# Patient Record
Sex: MALE | Race: OTHER | HISPANIC OR LATINO | URBAN - METROPOLITAN AREA
[De-identification: names, ages, dates, MRNs, and addresses within clinical notes are randomized per-mention and may not be internally consistent; named-entity substitution may affect disease eponyms.]

---

## 2018-03-23 ENCOUNTER — EMERGENCY (EMERGENCY)
Facility: HOSPITAL | Age: 48
LOS: 1 days | Discharge: HOME | End: 2018-03-23
Attending: EMERGENCY MEDICINE | Admitting: EMERGENCY MEDICINE
Payer: SELF-PAY

## 2018-03-23 VITALS
TEMPERATURE: 98 F | RESPIRATION RATE: 18 BRPM | DIASTOLIC BLOOD PRESSURE: 81 MMHG | HEART RATE: 78 BPM | OXYGEN SATURATION: 100 % | SYSTOLIC BLOOD PRESSURE: 119 MMHG

## 2018-03-23 VITALS — HEIGHT: 64 IN | WEIGHT: 141.1 LBS

## 2018-03-23 LAB
ALBUMIN SERPL ELPH-MCNC: 4.7 G/DL — SIGNIFICANT CHANGE UP (ref 3.3–5.2)
ALP SERPL-CCNC: 57 U/L — SIGNIFICANT CHANGE UP (ref 40–120)
ALT FLD-CCNC: 20 U/L — SIGNIFICANT CHANGE UP
ANION GAP SERPL CALC-SCNC: 11 MMOL/L — SIGNIFICANT CHANGE UP (ref 5–17)
APPEARANCE UR: CLEAR — SIGNIFICANT CHANGE UP
AST SERPL-CCNC: 25 U/L — SIGNIFICANT CHANGE UP
BASOPHILS # BLD AUTO: 0 K/UL — SIGNIFICANT CHANGE UP (ref 0–0.2)
BASOPHILS NFR BLD AUTO: 0.6 % — SIGNIFICANT CHANGE UP (ref 0–2)
BILIRUB SERPL-MCNC: 0.7 MG/DL — SIGNIFICANT CHANGE UP (ref 0.4–2)
BILIRUB UR-MCNC: NEGATIVE — SIGNIFICANT CHANGE UP
BUN SERPL-MCNC: 10 MG/DL — SIGNIFICANT CHANGE UP (ref 8–20)
CALCIUM SERPL-MCNC: 9.7 MG/DL — SIGNIFICANT CHANGE UP (ref 8.6–10.2)
CHLORIDE SERPL-SCNC: 102 MMOL/L — SIGNIFICANT CHANGE UP (ref 98–107)
CO2 SERPL-SCNC: 26 MMOL/L — SIGNIFICANT CHANGE UP (ref 22–29)
COLOR SPEC: YELLOW — SIGNIFICANT CHANGE UP
CREAT SERPL-MCNC: 0.65 MG/DL — SIGNIFICANT CHANGE UP (ref 0.5–1.3)
DIFF PNL FLD: NEGATIVE — SIGNIFICANT CHANGE UP
EOSINOPHIL # BLD AUTO: 0 K/UL — SIGNIFICANT CHANGE UP (ref 0–0.5)
EOSINOPHIL NFR BLD AUTO: 0.8 % — SIGNIFICANT CHANGE UP (ref 0–5)
GLUCOSE SERPL-MCNC: 102 MG/DL — SIGNIFICANT CHANGE UP (ref 70–115)
GLUCOSE UR QL: NEGATIVE MG/DL — SIGNIFICANT CHANGE UP
HCT VFR BLD CALC: 45.9 % — SIGNIFICANT CHANGE UP (ref 42–52)
HGB BLD-MCNC: 15.8 G/DL — SIGNIFICANT CHANGE UP (ref 14–18)
KETONES UR-MCNC: NEGATIVE — SIGNIFICANT CHANGE UP
LEUKOCYTE ESTERASE UR-ACNC: NEGATIVE — SIGNIFICANT CHANGE UP
LYMPHOCYTES # BLD AUTO: 2.3 K/UL — SIGNIFICANT CHANGE UP (ref 1–4.8)
LYMPHOCYTES # BLD AUTO: 43.8 % — SIGNIFICANT CHANGE UP (ref 20–55)
MCHC RBC-ENTMCNC: 29.9 PG — SIGNIFICANT CHANGE UP (ref 27–31)
MCHC RBC-ENTMCNC: 34.4 G/DL — SIGNIFICANT CHANGE UP (ref 32–36)
MCV RBC AUTO: 86.8 FL — SIGNIFICANT CHANGE UP (ref 80–94)
MONOCYTES # BLD AUTO: 0.2 K/UL — SIGNIFICANT CHANGE UP (ref 0–0.8)
MONOCYTES NFR BLD AUTO: 4.8 % — SIGNIFICANT CHANGE UP (ref 3–10)
NEUTROPHILS # BLD AUTO: 2.6 K/UL — SIGNIFICANT CHANGE UP (ref 1.8–8)
NEUTROPHILS NFR BLD AUTO: 50 % — SIGNIFICANT CHANGE UP (ref 37–73)
NITRITE UR-MCNC: NEGATIVE — SIGNIFICANT CHANGE UP
PH UR: 7 — SIGNIFICANT CHANGE UP (ref 5–8)
PLATELET # BLD AUTO: 262 K/UL — SIGNIFICANT CHANGE UP (ref 150–400)
POTASSIUM SERPL-MCNC: 4 MMOL/L — SIGNIFICANT CHANGE UP (ref 3.5–5.3)
POTASSIUM SERPL-SCNC: 4 MMOL/L — SIGNIFICANT CHANGE UP (ref 3.5–5.3)
PROT SERPL-MCNC: 8 G/DL — SIGNIFICANT CHANGE UP (ref 6.6–8.7)
PROT UR-MCNC: NEGATIVE MG/DL — SIGNIFICANT CHANGE UP
RBC # BLD: 5.29 M/UL — SIGNIFICANT CHANGE UP (ref 4.6–6.2)
RBC # FLD: 12.7 % — SIGNIFICANT CHANGE UP (ref 11–15.6)
SODIUM SERPL-SCNC: 139 MMOL/L — SIGNIFICANT CHANGE UP (ref 135–145)
SP GR SPEC: 1 — LOW (ref 1.01–1.02)
UROBILINOGEN FLD QL: NEGATIVE MG/DL — SIGNIFICANT CHANGE UP
WBC # BLD: 5.2 K/UL — SIGNIFICANT CHANGE UP (ref 4.8–10.8)
WBC # FLD AUTO: 5.2 K/UL — SIGNIFICANT CHANGE UP (ref 4.8–10.8)

## 2018-03-23 PROCEDURE — 99284 EMERGENCY DEPT VISIT MOD MDM: CPT | Mod: 25

## 2018-03-23 PROCEDURE — 99284 EMERGENCY DEPT VISIT MOD MDM: CPT

## 2018-03-23 PROCEDURE — 80053 COMPREHEN METABOLIC PANEL: CPT

## 2018-03-23 PROCEDURE — 74177 CT ABD & PELVIS W/CONTRAST: CPT

## 2018-03-23 PROCEDURE — 85027 COMPLETE CBC AUTOMATED: CPT

## 2018-03-23 PROCEDURE — T1013: CPT

## 2018-03-23 PROCEDURE — 36415 COLL VENOUS BLD VENIPUNCTURE: CPT

## 2018-03-23 PROCEDURE — 74177 CT ABD & PELVIS W/CONTRAST: CPT | Mod: 26

## 2018-03-23 PROCEDURE — 76870 US EXAM SCROTUM: CPT

## 2018-03-23 PROCEDURE — 76870 US EXAM SCROTUM: CPT | Mod: 26

## 2018-03-23 PROCEDURE — 81003 URINALYSIS AUTO W/O SCOPE: CPT

## 2018-03-23 PROCEDURE — 96374 THER/PROPH/DIAG INJ IV PUSH: CPT | Mod: XU

## 2018-03-23 RX ORDER — KETOROLAC TROMETHAMINE 30 MG/ML
15 SYRINGE (ML) INJECTION ONCE
Qty: 0 | Refills: 0 | Status: DISCONTINUED | OUTPATIENT
Start: 2018-03-23 | End: 2018-03-23

## 2018-03-23 RX ADMIN — Medication 15 MILLIGRAM(S): at 11:43

## 2018-03-23 NOTE — ED PROVIDER NOTE - CONDUCTED A DETAILED DISCUSSION WITH PATIENT AND/OR GUARDIAN REGARDING, MDM
radiology results/lab results return to ED if symptoms worsen, persist or questions arise/need for outpatient follow-up/radiology results/lab results

## 2018-03-23 NOTE — ED PROVIDER NOTE - ATTENDING CONTRIBUTION TO CARE
seen with acp  c/o right inguinal hernia and swelling noted over right groin  PE reveals a reducible right inguinal hernia  had a ct and ultrasound  which reveals a hydrocoele and hernia  will refer to clinic for repair  Agree with acps assessment hx and physical

## 2018-03-23 NOTE — ED PROVIDER NOTE - OBJECTIVE STATEMENT
49 y/o M presents with c/o right inguinal hernia x 2 months.  He presents today with increased pain.  Was seen by PCP 3/8 Dr. Shaffer and told to come to the ED because he has no insurance.   He states that when he lays down it goes back in.

## 2018-03-23 NOTE — ED PROVIDER NOTE - PROGRESS NOTE DETAILS
NP NOTE:  Labs and Ct reviewed, no evidence of inguinal hernia, recommend US of scrotum. NP note:  US with bilateral hydrocele.  Will d/c home refer to Urology.

## 2018-03-23 NOTE — ED ADULT NURSE NOTE - OBJECTIVE STATEMENT
pt was seen at Dr Shaffer for hernia surg eval and sent here "they told us to come here to see if he needs surgery"  pt with right groin pain for 2 mths  took advil this AM

## 2023-10-14 ENCOUNTER — EMERGENCY (EMERGENCY)
Facility: HOSPITAL | Age: 53
LOS: 1 days | Discharge: HOME | End: 2023-10-14
Attending: EMERGENCY MEDICINE
Payer: COMMERCIAL

## 2023-10-14 VITALS
OXYGEN SATURATION: 99 % | SYSTOLIC BLOOD PRESSURE: 149 MMHG | HEART RATE: 97 BPM | HEIGHT: 63 IN | WEIGHT: 149.69 LBS | TEMPERATURE: 98 F | RESPIRATION RATE: 16 BRPM | DIASTOLIC BLOOD PRESSURE: 76 MMHG

## 2023-10-14 PROCEDURE — T1013: CPT

## 2023-10-14 PROCEDURE — 96374 THER/PROPH/DIAG INJ IV PUSH: CPT

## 2023-10-14 PROCEDURE — 99284 EMERGENCY DEPT VISIT MOD MDM: CPT | Mod: 25

## 2023-10-14 PROCEDURE — 90715 TDAP VACCINE 7 YRS/> IM: CPT

## 2023-10-14 PROCEDURE — 73140 X-RAY EXAM OF FINGER(S): CPT | Mod: 26,RT

## 2023-10-14 PROCEDURE — 99283 EMERGENCY DEPT VISIT LOW MDM: CPT

## 2023-10-14 PROCEDURE — 99285 EMERGENCY DEPT VISIT HI MDM: CPT

## 2023-10-14 PROCEDURE — 90471 IMMUNIZATION ADMIN: CPT

## 2023-10-14 PROCEDURE — 73140 X-RAY EXAM OF FINGER(S): CPT

## 2023-10-14 RX ORDER — TETANUS TOXOID, REDUCED DIPHTHERIA TOXOID AND ACELLULAR PERTUSSIS VACCINE, ADSORBED 5; 2.5; 8; 8; 2.5 [IU]/.5ML; [IU]/.5ML; UG/.5ML; UG/.5ML; UG/.5ML
0.5 SUSPENSION INTRAMUSCULAR ONCE
Refills: 0 | Status: COMPLETED | OUTPATIENT
Start: 2023-10-14 | End: 2023-10-14

## 2023-10-14 RX ORDER — CEPHALEXIN 500 MG
1 CAPSULE ORAL
Qty: 40 | Refills: 0
Start: 2023-10-14 | End: 2023-10-23

## 2023-10-14 RX ORDER — CEFAZOLIN SODIUM 1 G
1000 VIAL (EA) INJECTION ONCE
Refills: 0 | Status: DISCONTINUED | OUTPATIENT
Start: 2023-10-14 | End: 2023-10-14

## 2023-10-14 RX ORDER — CEFAZOLIN SODIUM 1 G
1000 VIAL (EA) INJECTION ONCE
Refills: 0 | Status: COMPLETED | OUTPATIENT
Start: 2023-10-14 | End: 2023-10-14

## 2023-10-14 RX ADMIN — Medication 1000 MILLIGRAM(S): at 16:19

## 2023-10-14 RX ADMIN — TETANUS TOXOID, REDUCED DIPHTHERIA TOXOID AND ACELLULAR PERTUSSIS VACCINE, ADSORBED 0.5 MILLILITER(S): 5; 2.5; 8; 8; 2.5 SUSPENSION INTRAMUSCULAR at 16:04

## 2023-10-14 NOTE — ED ADULT NURSE NOTE - NSFALLUNIVINTERV_ED_ALL_ED
Bed/Stretcher in lowest position, wheels locked, appropriate side rails in place/Call bell, personal items and telephone in reach/Instruct patient to call for assistance before getting out of bed/chair/stretcher/Non-slip footwear applied when patient is off stretcher/[place] to call system/Physically safe environment - no spills, clutter or unnecessary equipment/Purposeful proactive rounding/Room/bathroom lighting operational, light cord in reach

## 2023-10-14 NOTE — ED PROVIDER NOTE - OBJECTIVE STATEMENT
52 y/o M distal amputation of right middle finger which occurred 3 hours ago while fixing his car.  Not up to date on tetanus.

## 2023-10-14 NOTE — ED ADULT TRIAGE NOTE - CHIEF COMPLAINT QUOTE
Pt was changing a tire on a trailer and the toma slipped and fell onto patients right 3rd digit. +amputation to the tip of finger.

## 2023-10-14 NOTE — ED PROVIDER NOTE - NSFOLLOWUPINSTRUCTIONS_ED_ALL_ED_FT
leave splint clean and dry until follow up appointment next Friday, 10/20  take antibiotics as prescribed

## 2023-10-14 NOTE — ED PROVIDER NOTE - CARE PROVIDER_API CALL
Solo Lawrence  Plastic Surgery  [address] [place] [id number]  Phone: [phone]  Fax: [phone]  Follow Up Time:

## 2023-10-14 NOTE — ED ADULT NURSE NOTE - OBJECTIVE STATEMENT
Patient presents to ED C/O amputation to right 3th finger, reports injury while fixing tire when toma slipped and trailer fell on patient's hand, control bleeding, no blood thinners, resp even/unlabored.

## 2023-10-14 NOTE — ED PROVIDER NOTE - NS ED ATTENDING STATEMENT MOD
This was a shared visit with the CLARIBEL. I reviewed and verified the documentation and independently performed the documented:

## 2023-10-14 NOTE — ED PROVIDER NOTE - PATIENT PORTAL LINK FT
You can access the FollowMyHealth Patient Portal offered by [facility] by registering at the following website: [email or web address]. By joining [facility]’s FollowMyHealth portal, you will also be able to view your health information using other applications (apps) compatible with our system.

## 2023-10-14 NOTE — CONSULT NOTE ADULT - SUBJECTIVE AND OBJECTIVE BOX
Patient is a 53y Male presenting to the emergency department with a chief complaint of right 3rd digit amputation after      Review of Systems:    Denies fever, chills  Denies rashes  Denies SOB  Denies CP  Denies Abdominal pain  Denies paresthesias  Denies dysuria    PAST MEDICAL & SURGICAL HISTORY:  No pertinent past medical history    No significant past surgical history    Allergies: No Known Allergies    Medications:     FAMILY HISTORY:  : non-contributory    Social History: lives at  ETOH:  Smoking:    Ambulation:     PHYSICAL EXAM:    Vital Signs Last 24 Hrs  T(C): 36.7 (14 Oct 2023 14:27), Max: 36.7 (14 Oct 2023 14:27)  T(F): 98.1 (14 Oct 2023 14:27), Max: 98.1 (14 Oct 2023 14:27)  HR: 97 (14 Oct 2023 14:27) (97 - 97)  BP: 149/76 (14 Oct 2023 14:27) (149/76 - 149/76)  BP(mean): --  RR: 16 (14 Oct 2023 14:27) (16 - 16)  SpO2: 99% (14 Oct 2023 14:27) (99% - 99%)      Appearance: Alert, responsive, in no acute distress.                   Lower extremity:  Shortened, externally rotated  Skin intact, no open wounds, ecchymosis, rashes, s/o infection  positive log roll, negative pelvic compression test  Unable to perform Straight leg raise, hip ROM limited secondary to pain, TTP around hip joint/proximal thigh  EHL/TA/GS/FHL intact, Gross SILT distally  DP pulse 2+ PT pulse 1+   Calf soft, NT B/L             Upper Extremity:  AIN/PIN/intrinsics intact  SILT Rad/med/uln distrib. Rad pulse +    Imaging Studies:    A/P:  Pt is a  53y Male with Patient is a 53y old  Male who presents with a chief complaint of  found to have    PLAN:   NPO for OR tomorrow  IV fluids ordered and to start once NPO  Pre-operative ABX ordered  Routine daily anticoagulation held for OR  Single dose anticoaguation ordered  Medical clearance requested for procedure  Bed rest/NWB Patient seen and eval at bedside with hospital . Patient is a 53y Male presenting to the emergency department with a chief complaint of right 3rd digit amputation after a hitch fell on it changing a tire at home on his trailer. Patient denies pain anywhere else.    Review of Systems:    Denies fever, chills  Denies rashes  Denies SOB  Denies CP  Denies Abdominal pain  Denies paresthesias  Denies dysuria    PAST MEDICAL & SURGICAL HISTORY:  No pertinent past medical history    No significant past surgical history    Allergies: No Known Allergies    Medications: see med rec    FAMILY HISTORY:  : non-contributory    Social History:      PHYSICAL EXAM:    Vital Signs Last 24 Hrs  T(C): 36.7 (14 Oct 2023 14:27), Max: 36.7 (14 Oct 2023 14:27)  T(F): 98.1 (14 Oct 2023 14:27), Max: 98.1 (14 Oct 2023 14:27)  HR: 97 (14 Oct 2023 14:27) (97 - 97)  BP: 149/76 (14 Oct 2023 14:27) (149/76 - 149/76)  RR: 16 (14 Oct 2023 14:27) (16 - 16)  SpO2: 99% (14 Oct 2023 14:27) (99% - 99%)      Appearance: Alert, responsive, in no acute distress.  Right upper extremity:   Right hand 3rd digit distal aspect amputated. Amputated distal phalanx in plastic bag on ice at bedside.          AIN/PIN/intrinsics intact  SILT Rad/med/uln distrib. Rad pulse +2    Imaging Studies: right hand 3rd digit xrays reveal distal phalanx ampuation, distal to DIP joint.    A/P:  Pt is a  53y Male with distal phalanx amputation  PLAN:   D/w Dr. Lawrence - coming into the ED for revision amputation and closure  Cont care as per ED Patient seen and eval at bedside with hospital . Patient is a 53y Male presenting to the emergency department with a chief complaint of right 3rd digit amputation after a hitch fell on it changing a tire at home on his trailer. Patient denies pain anywhere else.    Review of Systems:    Denies fever, chills  Denies rashes  Denies SOB  Denies CP  Denies Abdominal pain  Denies paresthesias  Denies dysuria    PAST MEDICAL & SURGICAL HISTORY:  No pertinent past medical history    No significant past surgical history    Allergies: No Known Allergies    Medications: see med rec    FAMILY HISTORY:  : non-contributory    Social History:      PHYSICAL EXAM:    Vital Signs Last 24 Hrs  T(C): 36.7 (14 Oct 2023 14:27), Max: 36.7 (14 Oct 2023 14:27)  T(F): 98.1 (14 Oct 2023 14:27), Max: 98.1 (14 Oct 2023 14:27)  HR: 97 (14 Oct 2023 14:27) (97 - 97)  BP: 149/76 (14 Oct 2023 14:27) (149/76 - 149/76)  RR: 16 (14 Oct 2023 14:27) (16 - 16)  SpO2: 99% (14 Oct 2023 14:27) (99% - 99%)      Appearance: Alert, responsive, in no acute distress.  Right upper extremity:   Right hand 3rd digit distal aspect amputated. Amputated distal phalanx in plastic bag on ice at bedside.          AIN/PIN/intrinsics intact  SILT Rad/med/uln distrib. Rad pulse +2    Imaging Studies: right hand 3rd digit xrays reveal distal phalanx ampuation, distal to DIP joint.    A/P:  Pt is a  53y Male with distal phalanx amputation  PLAN:   D/w Dr. Lawrence - coming into the ED for revision amputation and closure  Cont care as per ED  F/u 1 week  Antibiotics as per ED  Pain control  Splint - keep dry and intact - NWB right hand  D/w Dr. Lawrence

## 2024-07-17 NOTE — ED PROVIDER NOTE - MUSCULOSKELETAL, MLM
and other nicotine products. This includes smoking and vaping. If you need help quitting, talk to your doctor about stop-smoking programs and medicines. These can increase your chances of quitting for good. Quitting is one of the most important things you can do to protect your heart. It is never too late to quit. Try to avoid secondhand smoke too.     Stay at a weight that's healthy for you. Talk to your doctor if you need help losing weight.     Try to get 7 to 9 hours of sleep each night.     Limit alcohol to 2 drinks a day for men and 1 drink a day for women. Too much alcohol can cause health problems.     Manage other health problems such as diabetes, high blood pressure, and high cholesterol. If you think you may have a problem with alcohol or drug use, talk to your doctor.   Medicines    Take your medicines exactly as prescribed. Call your doctor if you think you are having a problem with your medicine.     If your doctor recommends aspirin, take the amount directed each day. Make sure you take aspirin and not another kind of pain reliever, such as acetaminophen (Tylenol).   When should you call for help?   Call 911 if you have symptoms of a heart attack. These may include:    Chest pain or pressure, or a strange feeling in the chest.     Sweating.     Shortness of breath.     Pain, pressure, or a strange feeling in the back, neck, jaw, or upper belly or in one or both shoulders or arms.     Lightheadedness or sudden weakness.     A fast or irregular heartbeat.   After you call 911, the  may tell you to chew 1 adult-strength or 2 to 4 low-dose aspirin. Wait for an ambulance. Do not try to drive yourself.  Watch closely for changes in your health, and be sure to contact your doctor if you have any problems.  Where can you learn more?  Go to https://www.healthwise.net/patientEd and enter F075 to learn more about \"A Healthy Heart: Care Instructions.\"  Current as of: June 24, 2023  Content Version: 
Spine appears normal, range of motion is not limited, no muscle or joint tenderness